# Patient Record
Sex: MALE | Race: WHITE | NOT HISPANIC OR LATINO | URBAN - METROPOLITAN AREA
[De-identification: names, ages, dates, MRNs, and addresses within clinical notes are randomized per-mention and may not be internally consistent; named-entity substitution may affect disease eponyms.]

---

## 2023-05-15 ENCOUNTER — OFFICE (OUTPATIENT)
Dept: URBAN - METROPOLITAN AREA CLINIC 9 | Facility: CLINIC | Age: 28
End: 2023-05-15

## 2023-05-15 VITALS
HEIGHT: 69 IN | DIASTOLIC BLOOD PRESSURE: 88 MMHG | HEART RATE: 74 BPM | SYSTOLIC BLOOD PRESSURE: 130 MMHG | OXYGEN SATURATION: 100 % | WEIGHT: 108 LBS

## 2023-05-15 DIAGNOSIS — R10.10 UPPER ABDOMINAL PAIN, UNSPECIFIED: ICD-10-CM

## 2023-05-15 DIAGNOSIS — R63.4 ABNORMAL WEIGHT LOSS: ICD-10-CM

## 2023-05-15 DIAGNOSIS — R10.13 EPIGASTRIC PAIN: ICD-10-CM

## 2023-05-15 DIAGNOSIS — R63.0 ANOREXIA: ICD-10-CM

## 2023-05-15 DIAGNOSIS — R11.0 NAUSEA: ICD-10-CM

## 2023-05-15 PROCEDURE — 99204 OFFICE O/P NEW MOD 45 MIN: CPT | Performed by: NURSE PRACTITIONER

## 2023-05-15 NOTE — SERVICEHPINOTES
Mr. Mcclure is a 28-year-old male here for unexplained weight loss and loss of appetite.  He states that approximately 2 weeks ago he started feeling generally unwell with nausea and pain in the epigastric region.  He states that he has not been able to eat much in the last 2 weeks because of the pain after eating.  He was a vegetarian until 1 year ago when he started eating small portions of meat.  He states he stopped being a vegetarian because he was having frequent headaches.  He started drinking wheat grass protein shakes to remedy the headaches for which he stopped 2 weeks ago.  He also takes multiple vitamins daily - bee pollen, olive oil, K2, B12, probiotics, digestive enzymes, red onion powder, goose very powder, raw honey and shilajit. He has a significant amount of health anxiety and spends a lot of time researching the quality and safety of foods and the benefits of supplements. Prior to the onset of symptoms 2 weeks ago he states that he has always had a difficult time gaining weight.  He is 5 foot 9 and only weighs 108 lb today.  He appears cachectic.  He denies melena or hematochezia. He denies jaundice, fever or vomiting.  He does report more frequent bowel movements, some loose with  increased abdominal pain when having a bowel movement.  He does not have medical insurance at this time but will on June 5th.  He is accompanied by his grandmother who is very concerned about his health and reports that he eats very little throughout the day and mostly relies on supplementation. Labs from PCP May 5th normal other than elevated glucose and ketones in urine. His father is diabetic and he states that he does monitor his blood glucose regularly and recently did a home HgA1c test which was 4.7. He denies ETOH and drug use. ETOH and drug screen negative at PCP. He is currently on pantoprazole and Zofran prescribed by PCP. WIll get him scheduled for an Upper GI and further labs today. If Upper GI normal and pain continues will get him scheduled for a CT and EGD ( would like to wait until insurance is active but willing to pay out of pocket if necessary). I advised him to stop all supplements until the situation is resolved.

## 2023-05-18 LAB
C-REACTIVE PROTEIN, QUANT: <1 MG/L
CELIAC AB TTG DGP TIGA: DEAMIDATED GLIADIN ABS, IGA: 6 UNITS (ref 0–19)
CELIAC AB TTG DGP TIGA: DEAMIDATED GLIADIN ABS, IGG: 4 UNITS (ref 0–19)
CELIAC AB TTG DGP TIGA: IMMUNOGLOBULIN A, QN, SERUM: 261 MG/DL (ref 90–386)
CELIAC AB TTG DGP TIGA: T-TRANSGLUTAMINASE (TTG) IGA: <2 U/ML
CELIAC AB TTG DGP TIGA: T-TRANSGLUTAMINASE (TTG) IGG: <2 U/ML
HEMOGLOBIN A1C: 5 % (ref 4.8–5.6)
MICROSCOPIC EXAMINATION: BACTERIA: (no result)
MICROSCOPIC EXAMINATION: CASTS: (no result) /LPF
MICROSCOPIC EXAMINATION: EPITHELIAL CELLS (NON RENAL): (no result) /HPF
MICROSCOPIC EXAMINATION: RBC: (no result) /HPF
MICROSCOPIC EXAMINATION: WBC: (no result) /HPF
URINALYSIS, COMPLETE: APPEARANCE: CLEAR
URINALYSIS, COMPLETE: BILIRUBIN: NEGATIVE
URINALYSIS, COMPLETE: GLUCOSE: NEGATIVE
URINALYSIS, COMPLETE: KETONES: ABNORMAL
URINALYSIS, COMPLETE: MICROSCOPIC EXAMINATION: (no result)
URINALYSIS, COMPLETE: NITRITE, URINE: NEGATIVE
URINALYSIS, COMPLETE: OCCULT BLOOD: ABNORMAL
URINALYSIS, COMPLETE: PH: 7 (ref 5–7.5)
URINALYSIS, COMPLETE: PROTEIN: (no result)
URINALYSIS, COMPLETE: SPECIFIC GRAVITY: 1.02 (ref 1–1.03)
URINALYSIS, COMPLETE: URINE-COLOR: YELLOW
URINALYSIS, COMPLETE: UROBILINOGEN,SEMI-QN: 0.2 MG/DL (ref 0.2–1)
URINALYSIS, COMPLETE: WBC ESTERASE: ABNORMAL

## 2023-05-25 LAB — CALPROTECTIN, FECAL: 196 UG/G — HIGH (ref 0–120)

## 2023-06-27 ENCOUNTER — AMBULATORY SURGICAL CENTER (OUTPATIENT)
Dept: URBAN - METROPOLITAN AREA SURGERY 2 | Facility: SURGERY | Age: 28
End: 2023-06-27

## 2023-06-27 ENCOUNTER — OFFICE (OUTPATIENT)
Dept: URBAN - METROPOLITAN AREA PATHOLOGY 12 | Facility: PATHOLOGY | Age: 28
End: 2023-06-27

## 2023-06-27 VITALS
SYSTOLIC BLOOD PRESSURE: 125 MMHG | DIASTOLIC BLOOD PRESSURE: 73 MMHG | DIASTOLIC BLOOD PRESSURE: 70 MMHG | OXYGEN SATURATION: 100 % | RESPIRATION RATE: 17 BRPM | DIASTOLIC BLOOD PRESSURE: 67 MMHG | SYSTOLIC BLOOD PRESSURE: 103 MMHG | SYSTOLIC BLOOD PRESSURE: 125 MMHG | HEIGHT: 69 IN | TEMPERATURE: 98.3 F | WEIGHT: 112.4 LBS | HEART RATE: 56 BPM | OXYGEN SATURATION: 100 % | RESPIRATION RATE: 16 BRPM | SYSTOLIC BLOOD PRESSURE: 100 MMHG | DIASTOLIC BLOOD PRESSURE: 70 MMHG | HEART RATE: 70 BPM | SYSTOLIC BLOOD PRESSURE: 95 MMHG | HEART RATE: 70 BPM | WEIGHT: 112.4 LBS | OXYGEN SATURATION: 99 % | RESPIRATION RATE: 16 BRPM | DIASTOLIC BLOOD PRESSURE: 66 MMHG | SYSTOLIC BLOOD PRESSURE: 95 MMHG | SYSTOLIC BLOOD PRESSURE: 110 MMHG | DIASTOLIC BLOOD PRESSURE: 66 MMHG | HEIGHT: 69 IN | DIASTOLIC BLOOD PRESSURE: 67 MMHG | HEART RATE: 67 BPM | HEART RATE: 71 BPM | OXYGEN SATURATION: 99 % | DIASTOLIC BLOOD PRESSURE: 58 MMHG | HEART RATE: 71 BPM | OXYGEN SATURATION: 100 % | DIASTOLIC BLOOD PRESSURE: 73 MMHG | SYSTOLIC BLOOD PRESSURE: 110 MMHG | SYSTOLIC BLOOD PRESSURE: 125 MMHG | HEART RATE: 71 BPM | HEART RATE: 67 BPM | RESPIRATION RATE: 16 BRPM | OXYGEN SATURATION: 99 % | TEMPERATURE: 98.3 F | SYSTOLIC BLOOD PRESSURE: 100 MMHG | RESPIRATION RATE: 17 BRPM | DIASTOLIC BLOOD PRESSURE: 70 MMHG | DIASTOLIC BLOOD PRESSURE: 73 MMHG | SYSTOLIC BLOOD PRESSURE: 95 MMHG | TEMPERATURE: 98 F | DIASTOLIC BLOOD PRESSURE: 58 MMHG | HEART RATE: 70 BPM | HEART RATE: 60 BPM | DIASTOLIC BLOOD PRESSURE: 66 MMHG | HEIGHT: 69 IN | HEART RATE: 60 BPM | WEIGHT: 112.4 LBS | TEMPERATURE: 98 F | SYSTOLIC BLOOD PRESSURE: 110 MMHG | HEART RATE: 56 BPM | TEMPERATURE: 98.3 F | HEART RATE: 56 BPM | HEART RATE: 60 BPM | SYSTOLIC BLOOD PRESSURE: 103 MMHG | SYSTOLIC BLOOD PRESSURE: 100 MMHG | DIASTOLIC BLOOD PRESSURE: 67 MMHG | RESPIRATION RATE: 17 BRPM | SYSTOLIC BLOOD PRESSURE: 103 MMHG | DIASTOLIC BLOOD PRESSURE: 58 MMHG | TEMPERATURE: 98 F | HEART RATE: 67 BPM

## 2023-06-27 DIAGNOSIS — R11.0 NAUSEA: ICD-10-CM

## 2023-06-27 DIAGNOSIS — K29.50 UNSPECIFIED CHRONIC GASTRITIS WITHOUT BLEEDING: ICD-10-CM

## 2023-06-27 DIAGNOSIS — R10.13 EPIGASTRIC PAIN: ICD-10-CM

## 2023-06-27 DIAGNOSIS — R63.4 ABNORMAL WEIGHT LOSS: ICD-10-CM

## 2023-06-27 PROCEDURE — 43239 EGD BIOPSY SINGLE/MULTIPLE: CPT | Performed by: INTERNAL MEDICINE

## 2023-06-27 PROCEDURE — 88342 IMHCHEM/IMCYTCHM 1ST ANTB: CPT | Performed by: PATHOLOGY

## 2023-06-27 PROCEDURE — 88305 TISSUE EXAM BY PATHOLOGIST: CPT | Performed by: PATHOLOGY

## 2023-06-27 PROCEDURE — 88313 SPECIAL STAINS GROUP 2: CPT | Performed by: PATHOLOGY

## 2023-07-13 ENCOUNTER — OFFICE (OUTPATIENT)
Dept: URBAN - METROPOLITAN AREA CLINIC 9 | Facility: CLINIC | Age: 28
End: 2023-07-13

## 2023-07-13 VITALS
RESPIRATION RATE: 15 BRPM | WEIGHT: 109 LBS | HEIGHT: 69 IN | SYSTOLIC BLOOD PRESSURE: 114 MMHG | OXYGEN SATURATION: 99 % | HEART RATE: 70 BPM | DIASTOLIC BLOOD PRESSURE: 69 MMHG

## 2023-07-13 DIAGNOSIS — R63.4 ABNORMAL WEIGHT LOSS: ICD-10-CM

## 2023-07-13 DIAGNOSIS — R10.13 EPIGASTRIC PAIN: ICD-10-CM

## 2023-07-13 DIAGNOSIS — R19.5 OTHER FECAL ABNORMALITIES: ICD-10-CM

## 2023-07-13 PROCEDURE — 99214 OFFICE O/P EST MOD 30 MIN: CPT | Performed by: NURSE PRACTITIONER

## 2023-07-13 RX ORDER — DICYCLOMINE HYDROCHLORIDE 10 MG/1
CAPSULE ORAL
Qty: 90 | Refills: 3 | Status: ACTIVE
Start: 2023-07-13

## 2023-07-13 RX ORDER — PANTOPRAZOLE SODIUM 40 MG/1
40 TABLET, DELAYED RELEASE ORAL
Qty: 30 | Refills: 11 | Status: ACTIVE
Start: 2023-07-13

## 2023-07-13 NOTE — SERVICEHPINOTES
Mr. Mcclure is a 29 y/o male here for follow up for fatigue and unexplained weight loss. 
alena carvajal I first saw him May 15th 2023 - OV note -alena carvajal He states that approximately 2 weeks ago he started feeling generally unwell with nausea and pain in the epigastric region. He states that he has not been able to eat much in the last 2 weeks because of the pain after eating. He was a vegetarian until 1 year ago when he started eating small portions of meat. He states he stopped being a vegetarian because he was having frequent headaches. He started drinking wheat grass protein shakes to remedy the headaches for which he stopped 2 weeks ago. He also takes multiple vitamins daily - bee pollen, olive oil, K2, B12, probiotics, digestive enzymes, red onion powder, goose very powder, raw honey and shilajit. He has a significant amount of health anxiety and spends a lot of time researching the quality and safety of foods and the benefits of supplements. Prior to the onset of symptoms 2 weeks ago he states that he has always had a difficult time gaining weight. He is 5 foot 9 and only weighs 108 lb today. He appears cachectic. He denies melena or hematochezia. He denies jaundice, fever or vomiting. He does report more frequent bowel movements, some loose with increased abdominal pain when having a bowel movement. Labs from PCP May 5th normal other than elevated glucose and ketones in urine. His father is diabetic and he states that he does monitor his blood glucose regularly and recently did a home HgA1c test which was 4.7. He denies ETOH and drug use. ETOH and drug screen negative at PCP.
alena carvajal  Since last visit he has undergone an EGD which was normal other than mild gastritis. Celiac normal. Calprotectin was elevated but he could not afford the colonoscopy as his insurance does not have surgical benefits. CT abdomen normal. 
alena carvajal He reports that he is doing better. He is eating at least two meals a day without difficulty. He has stopped all vitamins and supplements. He reports increased energy level but is still having intermittent epigastric cramping and diarrhea which he attributes to stress when he is around a lot of noise and people, otherwise, he is having daily formed BMs without melena or hematochezia. He has gained one pound since our last visit - not losing weight. We discussed the need to proceed with the colonoscopy soon. He will be enrolling in the company insurance during open enrollment so hopefully will have surgical benefits. We discussed trying to find a therapist to help him with his issues of intolerance to noise and crowds. He would like to go back to work. From a gastroenterology standpoint he has improved and I feel it is safe for him to return to work. Start pantoprazole. Dicyclomine as needed for abdominal cramping. Repeat calprotectin.

## 2023-07-17 LAB — CALPROTECTIN, FECAL: <5 UG/G
